# Patient Record
Sex: FEMALE | NOT HISPANIC OR LATINO | URBAN - METROPOLITAN AREA
[De-identification: names, ages, dates, MRNs, and addresses within clinical notes are randomized per-mention and may not be internally consistent; named-entity substitution may affect disease eponyms.]

---

## 2018-07-02 ENCOUNTER — IMPORTED ENCOUNTER (OUTPATIENT)
Dept: URBAN - METROPOLITAN AREA CLINIC 1 | Facility: CLINIC | Age: 48
End: 2018-07-02

## 2018-07-02 PROBLEM — H52.11: Noted: 2018-07-02

## 2018-07-02 PROBLEM — H52.4: Noted: 2018-07-02

## 2018-07-02 PROCEDURE — S0620 ROUTINE OPHTHALMOLOGICAL EXA: HCPCS

## 2018-07-02 NOTE — PATIENT DISCUSSION
1. Myopia OD -- Finalized Glasses MRx was given to patients daughter (who translated for patient) and patient for correction if indicated and requested2. Presbyopia OU3. Cataracts OU -- Observe 4. Glaucoma Suspect OU (CD: 0.60 / 0.65) -- IOP 15 OU today. Unknown family h/o Glaucoma. Will have patient return under medical in 1 MO for further Glaucoma w/u & tests. Return for an appointment in 1 MO for a 30 / 24-2 [id number] / Nica Nagy OU with Dr. Gurpreet Mohan.

## 2018-08-13 ENCOUNTER — IMPORTED ENCOUNTER (OUTPATIENT)
Dept: URBAN - METROPOLITAN AREA CLINIC 1 | Facility: CLINIC | Age: 48
End: 2018-08-13

## 2018-08-13 PROBLEM — H40.013: Noted: 2018-08-13

## 2018-08-13 PROBLEM — H25.13: Noted: 2018-08-13

## 2018-08-13 PROCEDURE — 92133 CPTRZD OPH DX IMG PST SGM ON: CPT

## 2018-08-13 PROCEDURE — 92014 COMPRE OPH EXAM EST PT 1/>: CPT

## 2018-08-13 PROCEDURE — 92083 EXTENDED VISUAL FIELD XM: CPT

## 2018-08-13 NOTE — PATIENT DISCUSSION
1.  Glaucoma Suspect OU (0.7 OU/ unknown family hx): Stable IOP and C/D OU. Normal HVF/OCT OU. Patient is considered Low Risk. Condition was discussed with patient and patients daughter who both expressed understanding. Will continue to monitor patient for any progression in condition. Patient and patients daughter was advised to call us with any problems questions or concerns. 2.  Cataract OU: Observe for now without intervention. The patient was advised to contact us if any change or worsening of vision3. Return for an appointment for 30/OCT in year with Dr. Lopez Rodriguez.

## 2019-06-25 ENCOUNTER — IMPORTED ENCOUNTER (OUTPATIENT)
Dept: URBAN - METROPOLITAN AREA CLINIC 1 | Facility: CLINIC | Age: 49
End: 2019-06-25

## 2019-06-25 PROBLEM — H25.813: Noted: 2019-06-25

## 2019-06-25 PROBLEM — H40.013: Noted: 2019-06-25

## 2019-06-25 PROCEDURE — 92133 CPTRZD OPH DX IMG PST SGM ON: CPT

## 2019-06-25 PROCEDURE — 92015 DETERMINE REFRACTIVE STATE: CPT

## 2019-06-25 PROCEDURE — 92014 COMPRE OPH EXAM EST PT 1/>: CPT

## 2019-06-25 NOTE — PATIENT DISCUSSION
1.  Glaucoma Suspect OU (CD: 0.75 / 0.70) -- IOP stable today at 14 OU. Unknown Family h/o Glaucoma. OCT today shows WNL OU. Patient is considered Low Risk. Condition was discussed with patient and patient understands. Will continue to monitor patient for any progression in condition. Patient was advised to call us with any problems questions or concerns. 2.  Cataracts OU -- Observe for now without intervention. The patient was advised to contact us if any change or worsening of vision. Finalized Glasses MRx was given to patient today. Return for an appointment in 1 YR for a 30 OU with Dr. Marycarmen Banks.

## 2022-04-08 ASSESSMENT — VISUAL ACUITY
OS_CC: 20/20
OS_CC: 20/20
OD_CC: 20/25-2
OS_CC: 20/20
OD_CC: 20/30-1
OD_CC: 20/25

## 2022-04-08 ASSESSMENT — TONOMETRY
OS_IOP_MMHG: 14
OD_IOP_MMHG: 14
OS_IOP_MMHG: 16
OD_IOP_MMHG: 15
OD_IOP_MMHG: 16
OS_IOP_MMHG: 15